# Patient Record
Sex: FEMALE | Race: WHITE | NOT HISPANIC OR LATINO | Employment: UNEMPLOYED | URBAN - METROPOLITAN AREA
[De-identification: names, ages, dates, MRNs, and addresses within clinical notes are randomized per-mention and may not be internally consistent; named-entity substitution may affect disease eponyms.]

---

## 2020-08-08 ENCOUNTER — HOSPITAL ENCOUNTER (INPATIENT)
Age: 50
LOS: 1 days | Discharge: HOME | DRG: 661 | End: 2020-08-10
Attending: EMERGENCY MEDICINE | Admitting: HOSPITALIST

## 2020-08-08 DIAGNOSIS — N39.0 URINARY TRACT INFECTION WITHOUT HEMATURIA, SITE UNSPECIFIED: ICD-10-CM

## 2020-08-08 DIAGNOSIS — N20.1 URETERAL STONE: Primary | ICD-10-CM

## 2020-08-08 DIAGNOSIS — R07.9 CHEST PAIN, UNSPECIFIED TYPE: ICD-10-CM

## 2020-08-08 LAB
ANION GAP SERPL CALC-SCNC: 18 MMOL/L (ref 10–20)
BASOPHILS # BLD: 0.1 K/MCL (ref 0–0.3)
BASOPHILS NFR BLD: 1 %
BUN SERPL-MCNC: 14 MG/DL (ref 6–20)
BUN/CREAT SERPL: 13 (ref 7–25)
CALCIUM SERPL-MCNC: 9 MG/DL (ref 8.4–10.2)
CHLORIDE SERPL-SCNC: 107 MMOL/L (ref 98–107)
CO2 SERPL-SCNC: 21 MMOL/L (ref 21–32)
CREAT SERPL-MCNC: 1.05 MG/DL (ref 0.51–0.95)
DEPRECATED RDW RBC: 48.9 FL (ref 39–50)
EOSINOPHIL # BLD: 0.4 K/MCL (ref 0.1–0.5)
EOSINOPHIL NFR BLD: 6 %
ERYTHROCYTE [DISTWIDTH] IN BLOOD: 13.4 % (ref 11–15)
FASTING DURATION TIME PATIENT: ABNORMAL H
GFR SERPLBLD BASED ON 1.73 SQ M-ARVRAT: 62 ML/MIN/1.73M2
GLUCOSE SERPL-MCNC: 125 MG/DL (ref 65–99)
HCT VFR BLD CALC: 39.2 % (ref 36–46.5)
HGB BLD-MCNC: 13.6 G/DL (ref 12–15.5)
IMM GRANULOCYTES # BLD AUTO: 0 K/MCL (ref 0–0.2)
IMM GRANULOCYTES # BLD: 0 %
LYMPHOCYTES # BLD: 1.3 K/MCL (ref 1–4.8)
LYMPHOCYTES NFR BLD: 19 %
MCH RBC QN AUTO: 33.9 PG (ref 26–34)
MCHC RBC AUTO-ENTMCNC: 34.7 G/DL (ref 32–36.5)
MCV RBC AUTO: 97.8 FL (ref 78–100)
MONOCYTES # BLD: 0.8 K/MCL (ref 0.3–0.9)
MONOCYTES NFR BLD: 12 %
NEUTROPHILS # BLD: 4.3 K/MCL (ref 1.8–7.7)
NEUTROPHILS NFR BLD: 62 %
NRBC BLD MANUAL-RTO: 0 /100 WBC
PLATELET # BLD AUTO: 113 K/MCL (ref 140–450)
POTASSIUM SERPL-SCNC: 3.5 MMOL/L (ref 3.4–5.1)
RBC # BLD: 4.01 MIL/MCL (ref 4–5.2)
SODIUM SERPL-SCNC: 142 MMOL/L (ref 135–145)
WBC # BLD: 6.9 K/MCL (ref 4.2–11)

## 2020-08-08 PROCEDURE — 96374 THER/PROPH/DIAG INJ IV PUSH: CPT

## 2020-08-08 PROCEDURE — 87086 URINE CULTURE/COLONY COUNT: CPT | Performed by: EMERGENCY MEDICINE

## 2020-08-08 PROCEDURE — 85025 COMPLETE CBC W/AUTO DIFF WBC: CPT | Performed by: EMERGENCY MEDICINE

## 2020-08-08 PROCEDURE — 81001 URINALYSIS AUTO W/SCOPE: CPT | Performed by: EMERGENCY MEDICINE

## 2020-08-08 PROCEDURE — 80048 BASIC METABOLIC PNL TOTAL CA: CPT | Performed by: EMERGENCY MEDICINE

## 2020-08-08 PROCEDURE — 10002800 HB RX 250 W HCPCS: Performed by: EMERGENCY MEDICINE

## 2020-08-08 PROCEDURE — 96375 TX/PRO/DX INJ NEW DRUG ADDON: CPT

## 2020-08-08 PROCEDURE — 99285 EMERGENCY DEPT VISIT HI MDM: CPT

## 2020-08-08 RX ORDER — LEVOTHYROXINE SODIUM 0.05 MG/1
50 TABLET ORAL DAILY
COMMUNITY

## 2020-08-08 RX ORDER — ONDANSETRON 2 MG/ML
4 INJECTION INTRAMUSCULAR; INTRAVENOUS ONCE
Status: COMPLETED | OUTPATIENT
Start: 2020-08-08 | End: 2020-08-08

## 2020-08-08 RX ADMIN — ONDANSETRON 4 MG: 2 INJECTION INTRAMUSCULAR; INTRAVENOUS at 23:54

## 2020-08-08 RX ADMIN — KETOROLAC TROMETHAMINE 15 MG: 15 INJECTION, SOLUTION INTRAMUSCULAR; INTRAVENOUS at 23:56

## 2020-08-08 ASSESSMENT — PAIN DESCRIPTION - PAIN TYPE: TYPE: ACUTE PAIN

## 2020-08-08 ASSESSMENT — PAIN SCALES - GENERAL: PAINLEVEL_OUTOF10: 8

## 2020-08-09 ENCOUNTER — APPOINTMENT (OUTPATIENT)
Dept: CT IMAGING | Age: 50
DRG: 661 | End: 2020-08-09

## 2020-08-09 ENCOUNTER — ANESTHESIA (OUTPATIENT)
Dept: SURGERY | Age: 50
DRG: 661 | End: 2020-08-09

## 2020-08-09 ENCOUNTER — ANESTHESIA EVENT (OUTPATIENT)
Dept: SURGERY | Age: 50
DRG: 661 | End: 2020-08-09

## 2020-08-09 ENCOUNTER — APPOINTMENT (OUTPATIENT)
Dept: GENERAL RADIOLOGY | Age: 50
DRG: 661 | End: 2020-08-09
Attending: UROLOGY

## 2020-08-09 LAB
ANION GAP SERPL CALC-SCNC: 20 MMOL/L (ref 10–20)
APPEARANCE UR: ABNORMAL
ATRIAL RATE (BPM): 102
B-HCG UR QL: NEGATIVE
BACTERIA #/AREA URNS HPF: ABNORMAL /HPF
BASOPHILS # BLD: 0 K/MCL (ref 0–0.3)
BASOPHILS NFR BLD: 0 %
BILIRUB UR QL STRIP: NEGATIVE
BUN SERPL-MCNC: 15 MG/DL (ref 6–20)
BUN/CREAT SERPL: 12 (ref 7–25)
CALCIUM SERPL-MCNC: 8.2 MG/DL (ref 8.4–10.2)
CHLORIDE SERPL-SCNC: 109 MMOL/L (ref 98–107)
CO2 SERPL-SCNC: 17 MMOL/L (ref 21–32)
COLOR UR: YELLOW
CREAT SERPL-MCNC: 1.25 MG/DL (ref 0.51–0.95)
DEPRECATED RDW RBC: 50.4 FL (ref 39–50)
EOSINOPHIL # BLD: 0.1 K/MCL (ref 0.1–0.5)
EOSINOPHIL NFR BLD: 3 %
ERYTHROCYTE [DISTWIDTH] IN BLOOD: 13.7 % (ref 11–15)
FASTING DURATION TIME PATIENT: ABNORMAL H
GFR SERPLBLD BASED ON 1.73 SQ M-ARVRAT: 50 ML/MIN/1.73M2
GLUCOSE SERPL-MCNC: 104 MG/DL (ref 65–99)
GLUCOSE UR STRIP-MCNC: NEGATIVE MG/DL
HCT VFR BLD CALC: 38.3 % (ref 36–46.5)
HGB BLD-MCNC: 12.8 G/DL (ref 12–15.5)
HGB UR QL STRIP: ABNORMAL
HYALINE CASTS #/AREA URNS LPF: ABNORMAL /LPF
KETONES UR STRIP-MCNC: NEGATIVE MG/DL
LEUKOCYTE ESTERASE UR QL STRIP: ABNORMAL
LYMPHOCYTES # BLD: 0.2 K/MCL (ref 1–4.8)
LYMPHOCYTES NFR BLD: 8 %
MCH RBC QN AUTO: 33.8 PG (ref 26–34)
MCHC RBC AUTO-ENTMCNC: 33.4 G/DL (ref 32–36.5)
MCV RBC AUTO: 101.1 FL (ref 78–100)
MONOCYTES # BLD: 0 K/MCL (ref 0.3–0.9)
MONOCYTES NFR BLD: 0 %
MUCOUS THREADS URNS QL MICRO: PRESENT
NEUTROPHILS # BLD: 1.6 K/MCL (ref 1.8–7.7)
NEUTS BAND NFR BLD: 15 %
NEUTS SEG NFR BLD: 72 %
NITRITE UR QL STRIP: NEGATIVE
NRBC BLD MANUAL-RTO: 0 /100 WBC
P AXIS (DEGREES): 21
PH UR STRIP: 6 [PH] (ref 5–7)
PLAT MORPH BLD: NORMAL
PLATELET # BLD AUTO: 86 K/MCL (ref 140–450)
POTASSIUM SERPL-SCNC: 4.1 MMOL/L (ref 3.4–5.1)
PR-INTERVAL (MSEC): 126
PROT UR STRIP-MCNC: NEGATIVE MG/DL
QRS-INTERVAL (MSEC): 90
QT-INTERVAL (MSEC): 344
QTC: 448
R AXIS (DEGREES): 18
RAINBOW EXTRA TUBES HOLD SPECIMEN: NORMAL
RBC # BLD: 3.79 MIL/MCL (ref 4–5.2)
RBC #/AREA URNS HPF: ABNORMAL /HPF
RBC MORPH BLD: NORMAL
REPORT TEXT: NORMAL
SODIUM SERPL-SCNC: 142 MMOL/L (ref 135–145)
SP GR UR STRIP: 1.01 (ref 1–1.03)
SQUAMOUS #/AREA URNS HPF: ABNORMAL /HPF
T AXIS (DEGREES): 48
UROBILINOGEN UR STRIP-MCNC: 2 MG/DL
VARIANT LYMPHS NFR BLD: 2 %
VENTRICULAR RATE EKG/MIN (BPM): 102
WBC # BLD: 1.8 K/MCL (ref 4.2–11)
WBC #/AREA URNS HPF: >100 /HPF
WBC MORPH BLD: ABNORMAL
WBC TOXIC VACUOLES BLD QL SMEAR: PRESENT

## 2020-08-09 PROCEDURE — 74430 CONTRAST X-RAY BLADDER: CPT

## 2020-08-09 PROCEDURE — 10002807 HB RX 258: Performed by: HOSPITALIST

## 2020-08-09 PROCEDURE — 93005 ELECTROCARDIOGRAM TRACING: CPT | Performed by: UROLOGY

## 2020-08-09 PROCEDURE — G0378 HOSPITAL OBSERVATION PER HR: HCPCS

## 2020-08-09 PROCEDURE — 52332 CYSTOSCOPY AND TREATMENT: CPT | Performed by: UROLOGY

## 2020-08-09 PROCEDURE — 36415 COLL VENOUS BLD VENIPUNCTURE: CPT | Performed by: HOSPITALIST

## 2020-08-09 PROCEDURE — 0T778DZ DILATION OF LEFT URETER WITH INTRALUMINAL DEVICE, VIA NATURAL OR ARTIFICIAL OPENING ENDOSCOPIC: ICD-10-PCS | Performed by: UROLOGY

## 2020-08-09 PROCEDURE — 93010 ELECTROCARDIOGRAM REPORT: CPT | Performed by: INTERNAL MEDICINE

## 2020-08-09 PROCEDURE — 10002803 HB RX 637: Performed by: HOSPITALIST

## 2020-08-09 PROCEDURE — 10002800 HB RX 250 W HCPCS: Performed by: PHYSICIAN ASSISTANT

## 2020-08-09 PROCEDURE — 85027 COMPLETE CBC AUTOMATED: CPT | Performed by: HOSPITALIST

## 2020-08-09 PROCEDURE — 10002803 HB RX 637: Performed by: INTERNAL MEDICINE

## 2020-08-09 PROCEDURE — 10002807 HB RX 258: Performed by: INTERNAL MEDICINE

## 2020-08-09 PROCEDURE — 99285 EMERGENCY DEPT VISIT HI MDM: CPT | Performed by: EMERGENCY MEDICINE

## 2020-08-09 PROCEDURE — 80048 BASIC METABOLIC PNL TOTAL CA: CPT | Performed by: HOSPITALIST

## 2020-08-09 PROCEDURE — 10006391 HB COMPLEX PROCEDURE: Performed by: UROLOGY

## 2020-08-09 PROCEDURE — 99220 INITIAL OBSERVATION CARE,LEVL III: CPT | Performed by: UROLOGY

## 2020-08-09 PROCEDURE — 10002800 HB RX 250 W HCPCS: Performed by: EMERGENCY MEDICINE

## 2020-08-09 PROCEDURE — 10002801 HB RX 250 W/O HCPCS: Performed by: UROLOGY

## 2020-08-09 PROCEDURE — 74176 CT ABD & PELVIS W/O CONTRAST: CPT | Performed by: RADIOLOGY

## 2020-08-09 PROCEDURE — 10002807 HB RX 258: Performed by: PHYSICIAN ASSISTANT

## 2020-08-09 PROCEDURE — 52332 CYSTOSCOPY AND TREATMENT: CPT | Performed by: ANESTHESIOLOGY

## 2020-08-09 PROCEDURE — 10002800 HB RX 250 W HCPCS: Performed by: ANESTHESIOLOGY

## 2020-08-09 PROCEDURE — 10002362 HB ANESTH MAC IV 1ST 1/2 HR: Performed by: UROLOGY

## 2020-08-09 PROCEDURE — C1769 GUIDE WIRE: HCPCS | Performed by: UROLOGY

## 2020-08-09 PROCEDURE — 10002800 HB RX 250 W HCPCS: Performed by: HOSPITALIST

## 2020-08-09 PROCEDURE — 10002803 HB RX 637: Performed by: EMERGENCY MEDICINE

## 2020-08-09 PROCEDURE — 81025 URINE PREGNANCY TEST: CPT | Performed by: UROLOGY

## 2020-08-09 PROCEDURE — 10002807 HB RX 258: Performed by: ANESTHESIOLOGY

## 2020-08-09 PROCEDURE — 99220 INITIAL OBSERVATION CARE,LEVL III: CPT | Performed by: HOSPITALIST

## 2020-08-09 PROCEDURE — 10002807 HB RX 258: Performed by: EMERGENCY MEDICINE

## 2020-08-09 PROCEDURE — 74176 CT ABD & PELVIS W/O CONTRAST: CPT

## 2020-08-09 PROCEDURE — 10004651 HB RX, NO CHARGE ITEM: Performed by: HOSPITALIST

## 2020-08-09 RX ORDER — POTASSIUM CHLORIDE 20 MEQ/1
20 TABLET, EXTENDED RELEASE ORAL EVERY 4 HOURS PRN
Status: DISCONTINUED | OUTPATIENT
Start: 2020-08-09 | End: 2020-08-10 | Stop reason: HOSPADM

## 2020-08-09 RX ORDER — POTASSIUM CHLORIDE 14.9 MG/ML
20 INJECTION INTRAVENOUS EVERY 4 HOURS PRN
Status: DISCONTINUED | OUTPATIENT
Start: 2020-08-09 | End: 2020-08-10 | Stop reason: HOSPADM

## 2020-08-09 RX ORDER — CEFAZOLIN SODIUM/WATER 1 G/10 ML
1000 SYRINGE (ML) INTRAVENOUS DAILY
Status: DISCONTINUED | OUTPATIENT
Start: 2020-08-09 | End: 2020-08-10 | Stop reason: HOSPADM

## 2020-08-09 RX ORDER — LIDOCAINE HYDROCHLORIDE 20 MG/ML
JELLY TOPICAL PRN
Status: DISCONTINUED | OUTPATIENT
Start: 2020-08-09 | End: 2020-08-09 | Stop reason: HOSPADM

## 2020-08-09 RX ORDER — MAGNESIUM HYDROXIDE 1200 MG/15ML
LIQUID ORAL PRN
Status: DISCONTINUED | OUTPATIENT
Start: 2020-08-09 | End: 2020-08-09 | Stop reason: HOSPADM

## 2020-08-09 RX ORDER — TRAMADOL HYDROCHLORIDE 50 MG/1
50 TABLET ORAL EVERY 6 HOURS PRN
Status: DISCONTINUED | OUTPATIENT
Start: 2020-08-09 | End: 2020-08-10 | Stop reason: HOSPADM

## 2020-08-09 RX ORDER — MAGNESIUM SULFATE 1 G/100ML
1 INJECTION INTRAVENOUS DAILY PRN
Status: DISCONTINUED | OUTPATIENT
Start: 2020-08-09 | End: 2020-08-10 | Stop reason: HOSPADM

## 2020-08-09 RX ORDER — ONDANSETRON 2 MG/ML
4 INJECTION INTRAMUSCULAR; INTRAVENOUS EVERY 12 HOURS PRN
Status: DISCONTINUED | OUTPATIENT
Start: 2020-08-09 | End: 2020-08-10 | Stop reason: HOSPADM

## 2020-08-09 RX ORDER — 0.9 % SODIUM CHLORIDE 0.9 %
2 VIAL (ML) INJECTION EVERY 12 HOURS SCHEDULED
Status: DISCONTINUED | OUTPATIENT
Start: 2020-08-09 | End: 2020-08-10 | Stop reason: HOSPADM

## 2020-08-09 RX ORDER — ACETAMINOPHEN 325 MG/1
650 TABLET ORAL EVERY 4 HOURS PRN
Status: DISCONTINUED | OUTPATIENT
Start: 2020-08-09 | End: 2020-08-10 | Stop reason: HOSPADM

## 2020-08-09 RX ORDER — POTASSIUM CHLORIDE 1.5 G/1.58G
20 POWDER, FOR SOLUTION ORAL EVERY 4 HOURS PRN
Status: DISCONTINUED | OUTPATIENT
Start: 2020-08-09 | End: 2020-08-10 | Stop reason: HOSPADM

## 2020-08-09 RX ORDER — TAMSULOSIN HYDROCHLORIDE 0.4 MG/1
0.4 CAPSULE ORAL ONCE
Status: COMPLETED | OUTPATIENT
Start: 2020-08-09 | End: 2020-08-09

## 2020-08-09 RX ORDER — POTASSIUM CHLORIDE 14.9 G/1000ML
40 INJECTION, SOLUTION INTRAVENOUS EVERY 4 HOURS PRN
Status: DISCONTINUED | OUTPATIENT
Start: 2020-08-09 | End: 2020-08-10 | Stop reason: HOSPADM

## 2020-08-09 RX ORDER — CEFAZOLIN SODIUM/WATER 1 G/10 ML
1000 SYRINGE (ML) INTRAVENOUS ONCE
Status: COMPLETED | OUTPATIENT
Start: 2020-08-09 | End: 2020-08-09

## 2020-08-09 RX ORDER — LEVOTHYROXINE SODIUM 0.05 MG/1
50 TABLET ORAL DAILY
Status: DISCONTINUED | OUTPATIENT
Start: 2020-08-09 | End: 2020-08-10 | Stop reason: HOSPADM

## 2020-08-09 RX ORDER — SODIUM CHLORIDE 9 MG/ML
INJECTION, SOLUTION INTRAVENOUS CONTINUOUS
Status: DISCONTINUED | OUTPATIENT
Start: 2020-08-09 | End: 2020-08-10 | Stop reason: HOSPADM

## 2020-08-09 RX ORDER — POTASSIUM CHLORIDE 1.5 G/1.58G
40 POWDER, FOR SOLUTION ORAL EVERY 4 HOURS PRN
Status: DISCONTINUED | OUTPATIENT
Start: 2020-08-09 | End: 2020-08-10 | Stop reason: HOSPADM

## 2020-08-09 RX ORDER — PROPOFOL 10 MG/ML
INJECTION, EMULSION INTRAVENOUS PRN
Status: DISCONTINUED | OUTPATIENT
Start: 2020-08-09 | End: 2020-08-09

## 2020-08-09 RX ORDER — SODIUM CHLORIDE, SODIUM LACTATE, POTASSIUM CHLORIDE, CALCIUM CHLORIDE 600; 310; 30; 20 MG/100ML; MG/100ML; MG/100ML; MG/100ML
INJECTION, SOLUTION INTRAVENOUS CONTINUOUS PRN
Status: DISCONTINUED | OUTPATIENT
Start: 2020-08-09 | End: 2020-08-09

## 2020-08-09 RX ORDER — POTASSIUM CHLORIDE 20 MEQ/1
40 TABLET, EXTENDED RELEASE ORAL EVERY 4 HOURS PRN
Status: DISCONTINUED | OUTPATIENT
Start: 2020-08-09 | End: 2020-08-10 | Stop reason: HOSPADM

## 2020-08-09 RX ADMIN — SODIUM CHLORIDE: 9 INJECTION, SOLUTION INTRAVENOUS at 03:04

## 2020-08-09 RX ADMIN — HYDROMORPHONE HYDROCHLORIDE 1 MG: 1 INJECTION, SOLUTION INTRAMUSCULAR; INTRAVENOUS; SUBCUTANEOUS at 03:01

## 2020-08-09 RX ADMIN — SODIUM CHLORIDE: 9 INJECTION, SOLUTION INTRAVENOUS at 22:41

## 2020-08-09 RX ADMIN — TAMSULOSIN HYDROCHLORIDE 0.4 MG: 0.4 CAPSULE ORAL at 01:33

## 2020-08-09 RX ADMIN — LEVOTHYROXINE SODIUM 50 MCG: 50 TABLET ORAL at 15:51

## 2020-08-09 RX ADMIN — PROPOFOL 50 MG: 10 INJECTION, EMULSION INTRAVENOUS at 08:56

## 2020-08-09 RX ADMIN — ONDANSETRON 4 MG: 2 INJECTION INTRAMUSCULAR; INTRAVENOUS at 04:55

## 2020-08-09 RX ADMIN — POTASSIUM CHLORIDE 20 MEQ: 1500 TABLET, EXTENDED RELEASE ORAL at 03:01

## 2020-08-09 RX ADMIN — TRAMADOL HYDROCHLORIDE 50 MG: 50 TABLET, FILM COATED ORAL at 16:46

## 2020-08-09 RX ADMIN — SODIUM CHLORIDE 1000 ML: 9 INJECTION, SOLUTION INTRAVENOUS at 01:19

## 2020-08-09 RX ADMIN — PROPOFOL 125 MCG/KG/MIN: 10 INJECTION, EMULSION INTRAVENOUS at 08:56

## 2020-08-09 RX ADMIN — SODIUM CHLORIDE 500 ML: 9 INJECTION, SOLUTION INTRAVENOUS at 11:55

## 2020-08-09 RX ADMIN — CEFTRIAXONE SODIUM 1000 MG: 100 INJECTION, POWDER, FOR SOLUTION INTRAVENOUS at 01:31

## 2020-08-09 RX ADMIN — HYDROMORPHONE HYDROCHLORIDE 1 MG: 1 INJECTION, SOLUTION INTRAMUSCULAR; INTRAVENOUS; SUBCUTANEOUS at 06:33

## 2020-08-09 RX ADMIN — TRAMADOL HYDROCHLORIDE 50 MG: 50 TABLET, FILM COATED ORAL at 10:33

## 2020-08-09 RX ADMIN — MORPHINE SULFATE 4 MG: 4 INJECTION INTRAVENOUS at 01:23

## 2020-08-09 RX ADMIN — TRAMADOL HYDROCHLORIDE 50 MG: 50 TABLET, FILM COATED ORAL at 22:41

## 2020-08-09 RX ADMIN — ACETAMINOPHEN 650 MG: 325 TABLET ORAL at 22:41

## 2020-08-09 RX ADMIN — SODIUM CHLORIDE, POTASSIUM CHLORIDE, SODIUM LACTATE AND CALCIUM CHLORIDE: 600; 310; 30; 20 INJECTION, SOLUTION INTRAVENOUS at 08:53

## 2020-08-09 RX ADMIN — CEFTRIAXONE SODIUM 1000 MG: 100 INJECTION, POWDER, FOR SOLUTION INTRAVENOUS at 13:10

## 2020-08-09 ASSESSMENT — ACTIVITIES OF DAILY LIVING (ADL)
ADL_SCORE: 12
ADL_BEFORE_ADMISSION: INDEPENDENT
RECENT_DECLINE_ADL: NO
CHRONIC_PAIN_PRESENT: NO
ADL_SHORT_OF_BREATH: NO

## 2020-08-09 ASSESSMENT — PATIENT HEALTH QUESTIONNAIRE - PHQ9
CLINICAL INTERPRETATION OF PHQ9 SCORE: NO FURTHER SCREENING NEEDED
SUM OF ALL RESPONSES TO PHQ9 QUESTIONS 1 AND 2: 0
SUM OF ALL RESPONSES TO PHQ9 QUESTIONS 1 AND 2: 0
2. FEELING DOWN, DEPRESSED OR HOPELESS: NOT AT ALL
IS PATIENT ABLE TO COMPLETE PHQ2 OR PHQ9: YES
CLINICAL INTERPRETATION OF PHQ2 SCORE: NO FURTHER SCREENING NEEDED
1. LITTLE INTEREST OR PLEASURE IN DOING THINGS: NOT AT ALL

## 2020-08-09 ASSESSMENT — PAIN SCALES - GENERAL
PAINLEVEL_OUTOF10: 4
PAINLEVEL_OUTOF10: 5
PAINLEVEL_OUTOF10: 4
PAINLEVEL_OUTOF10: 6
PAINLEVEL_OUTOF10: 5
PAINLEVEL_OUTOF10: 4
PAINLEVEL_OUTOF10: 6
PAINLEVEL_OUTOF10: 3
PAINLEVEL_OUTOF10: 8
PAINLEVEL_OUTOF10: 5
PAINLEVEL_OUTOF10: 4
PAINLEVEL_OUTOF10: 8
PAINLEVEL_OUTOF10: 3
PAINLEVEL_OUTOF10: 4
PAINLEVEL_OUTOF10: 8

## 2020-08-09 ASSESSMENT — COLUMBIA-SUICIDE SEVERITY RATING SCALE - C-SSRS
6. HAVE YOU EVER DONE ANYTHING, STARTED TO DO ANYTHING, OR PREPARED TO DO ANYTHING TO END YOUR LIFE?: NO
IS THE PATIENT ABLE TO COMPLETE C-SSRS: YES
2. HAVE YOU ACTUALLY HAD ANY THOUGHTS OF KILLING YOURSELF?: NO
1. WITHIN THE PAST MONTH, HAVE YOU WISHED YOU WERE DEAD OR WISHED YOU COULD GO TO SLEEP AND NOT WAKE UP?: NO

## 2020-08-09 ASSESSMENT — COGNITIVE AND FUNCTIONAL STATUS - GENERAL
ARE YOU BLIND OR DO YOU HAVE SERIOUS DIFFICULTY SEEING, EVEN WHEN WEARING GLASSES: NO
ARE YOU DEAF OR DO YOU HAVE SERIOUS DIFFICULTY  HEARING: NO
BECAUSE OF A PHYSICAL, MENTAL, OR EMOTIONAL CONDITION, DO YOU HAVE DIFFICULTY DOING ERRANDS ALONE: NO
DO YOU HAVE SERIOUS DIFFICULTY WALKING OR CLIMBING STAIRS: NO
DO YOU HAVE DIFFICULTY DRESSING OR BATHING: NO
BECAUSE OF A PHYSICAL, MENTAL, OR EMOTIONAL CONDITION, DO YOU HAVE SERIOUS DIFFICULTY CONCENTRATING, REMEMBERING OR MAKING DECISIONS: NO

## 2020-08-09 ASSESSMENT — LIFESTYLE VARIABLES
HOW OFTEN DO YOU HAVE A DRINK CONTAINING ALCOHOL: 2 TO 4 TIMES PER MONTH
AUDIT-C TOTAL SCORE: 2
ALCOHOL_USE_STATUS: NO OR LOW RISK WITH VALIDATED TOOL
HOW MANY STANDARD DRINKS CONTAINING ALCOHOL DO YOU HAVE ON A TYPICAL DAY: 0,1 OR 2
HOW OFTEN DO YOU HAVE 6 OR MORE DRINKS ON ONE OCCASION: NEVER

## 2020-08-09 ASSESSMENT — PAIN DESCRIPTION - PAIN TYPE
TYPE: ACUTE PAIN
TYPE: ACUTE PAIN

## 2020-08-10 VITALS
OXYGEN SATURATION: 97 % | HEART RATE: 65 BPM | HEIGHT: 70 IN | DIASTOLIC BLOOD PRESSURE: 62 MMHG | TEMPERATURE: 97.7 F | BODY MASS INDEX: 28.18 KG/M2 | WEIGHT: 196.87 LBS | SYSTOLIC BLOOD PRESSURE: 101 MMHG | RESPIRATION RATE: 16 BRPM

## 2020-08-10 LAB
ANION GAP SERPL CALC-SCNC: 14 MMOL/L (ref 10–20)
BACTERIA UR CULT: ABNORMAL
BASOPHILS # BLD: 0.1 K/MCL (ref 0–0.3)
BASOPHILS NFR BLD: 1 %
BUN SERPL-MCNC: 14 MG/DL (ref 6–20)
BUN/CREAT SERPL: 11 (ref 7–25)
CALCIUM SERPL-MCNC: 7.8 MG/DL (ref 8.4–10.2)
CHLORIDE SERPL-SCNC: 112 MMOL/L (ref 98–107)
CO2 SERPL-SCNC: 20 MMOL/L (ref 21–32)
CREAT SERPL-MCNC: 1.24 MG/DL (ref 0.51–0.95)
DEPRECATED RDW RBC: 53 FL (ref 39–50)
EOSINOPHIL # BLD: 0.4 K/MCL (ref 0.1–0.5)
EOSINOPHIL NFR BLD: 4 %
ERYTHROCYTE [DISTWIDTH] IN BLOOD: 14 % (ref 11–15)
FASTING DURATION TIME PATIENT: ABNORMAL H
GFR SERPLBLD BASED ON 1.73 SQ M-ARVRAT: 51 ML/MIN/1.73M2
GLUCOSE SERPL-MCNC: 98 MG/DL (ref 65–99)
HCT VFR BLD CALC: 33.1 % (ref 36–46.5)
HGB BLD-MCNC: 10.9 G/DL (ref 12–15.5)
IMM GRANULOCYTES # BLD AUTO: 0.1 K/MCL (ref 0–0.2)
IMM GRANULOCYTES # BLD: 1 %
LYMPHOCYTES # BLD: 1 K/MCL (ref 1–4.8)
LYMPHOCYTES NFR BLD: 11 %
MCH RBC QN AUTO: 33.5 PG (ref 26–34)
MCHC RBC AUTO-ENTMCNC: 32.9 G/DL (ref 32–36.5)
MCV RBC AUTO: 101.8 FL (ref 78–100)
MONOCYTES # BLD: 1.2 K/MCL (ref 0.3–0.9)
MONOCYTES NFR BLD: 13 %
NEUTROPHILS # BLD: 6.4 K/MCL (ref 1.8–7.7)
NEUTROPHILS NFR BLD: 70 %
NRBC BLD MANUAL-RTO: 0 /100 WBC
PLATELET # BLD AUTO: 67 K/MCL (ref 140–450)
POTASSIUM SERPL-SCNC: 3.8 MMOL/L (ref 3.4–5.1)
RBC # BLD: 3.25 MIL/MCL (ref 4–5.2)
SODIUM SERPL-SCNC: 142 MMOL/L (ref 135–145)
WBC # BLD: 9.1 K/MCL (ref 4.2–11)

## 2020-08-10 PROCEDURE — 85025 COMPLETE CBC W/AUTO DIFF WBC: CPT | Performed by: INTERNAL MEDICINE

## 2020-08-10 PROCEDURE — 99239 HOSP IP/OBS DSCHRG MGMT >30: CPT | Performed by: INTERNAL MEDICINE

## 2020-08-10 PROCEDURE — 10000002 HB ROOM CHARGE MED SURG

## 2020-08-10 PROCEDURE — 80048 BASIC METABOLIC PNL TOTAL CA: CPT | Performed by: INTERNAL MEDICINE

## 2020-08-10 PROCEDURE — 10002803 HB RX 637: Performed by: INTERNAL MEDICINE

## 2020-08-10 PROCEDURE — G0378 HOSPITAL OBSERVATION PER HR: HCPCS

## 2020-08-10 PROCEDURE — 36415 COLL VENOUS BLD VENIPUNCTURE: CPT | Performed by: INTERNAL MEDICINE

## 2020-08-10 RX ORDER — CIPROFLOXACIN 500 MG/1
500 TABLET, FILM COATED ORAL EVERY 12 HOURS
Qty: 14 TABLET | Refills: 0 | Status: SHIPPED | OUTPATIENT
Start: 2020-08-10 | End: 2020-08-17

## 2020-08-10 RX ORDER — TRAMADOL HYDROCHLORIDE 50 MG/1
50 TABLET ORAL EVERY 6 HOURS PRN
Qty: 20 TABLET | Refills: 0 | Status: SHIPPED | OUTPATIENT
Start: 2020-08-10

## 2020-08-10 RX ADMIN — LEVOTHYROXINE SODIUM 50 MCG: 50 TABLET ORAL at 08:26

## 2020-08-10 ASSESSMENT — PAIN SCALES - GENERAL: PAINLEVEL_OUTOF10: 0

## 2023-02-28 RX ORDER — LEVOTHYROXINE SODIUM 0.05 MG/1
50 TABLET ORAL DAILY
Qty: 30 TABLET | Refills: 0 | Status: SHIPPED | OUTPATIENT
Start: 2023-02-28 | End: 2023-03-24

## 2023-02-28 NOTE — TELEPHONE ENCOUNTER
Caller: Oliver, Antonieta ASHLY    Relationship: Self    Best call back number: [phone]    Requested Prescriptions:   Requested Prescriptions     Pending Prescriptions Disp Refills   • levothyroxine (SYNTHROID, LEVOTHROID) 50 MCG tablet       Sig: Take 1 tablet by mouth.      Pharmacy where request should be sent: [id number] - [place], [place] - [address] [phone] [facility] [phone]      Additional details provided by patient:     PATIENT ADVISED SHE WILL RUN OUT OF THE MEDICATION WITHIN 3 DAYS    Does the patient have less than a 3 day supply:  [x] Yes  [] No    Would you like a call back once the refill request has been completed: [x] Yes [] No    If the office needs to give you a call back, can they leave a voicemail: [x] Yes [] No    Cyn Rodriguez Rep   02/28/23 13:25 EST

## 2023-03-24 RX ORDER — LEVOTHYROXINE SODIUM 0.05 MG/1
TABLET ORAL
Qty: 30 TABLET | Refills: 0 | Status: SHIPPED | OUTPATIENT
Start: 2023-03-24

## 2023-04-18 ENCOUNTER — OFFICE VISIT (OUTPATIENT)
Dept: FAMILY MEDICINE CLINIC | Facility: CLINIC | Age: 53
End: 2023-04-18
Payer: COMMERCIAL

## 2023-04-18 VITALS
BODY MASS INDEX: 27.06 KG/M2 | SYSTOLIC BLOOD PRESSURE: 120 MMHG | WEIGHT: 189 LBS | HEART RATE: 81 BPM | DIASTOLIC BLOOD PRESSURE: 82 MMHG | OXYGEN SATURATION: 96 % | HEIGHT: 70 IN

## 2023-04-18 DIAGNOSIS — I81 PORTAL VEIN THROMBOSIS: ICD-10-CM

## 2023-04-18 DIAGNOSIS — D69.6 THROMBOCYTOPENIA: ICD-10-CM

## 2023-04-18 DIAGNOSIS — K70.30 ALCOHOLIC CIRRHOSIS OF LIVER WITHOUT ASCITES: ICD-10-CM

## 2023-04-18 DIAGNOSIS — E03.9 HYPOTHYROIDISM (ACQUIRED): ICD-10-CM

## 2023-04-18 DIAGNOSIS — Z00.00 GENERAL MEDICAL EXAM: Primary | ICD-10-CM

## 2023-04-18 DIAGNOSIS — E53.8 VITAMIN B12 DEFICIENCY: ICD-10-CM

## 2023-04-18 PROBLEM — I85.10 SECONDARY ESOPHAGEAL VARICES WITHOUT BLEEDING: Status: RESOLVED | Noted: 2023-04-05 | Resolved: 2023-04-18

## 2023-04-18 PROBLEM — D50.9 IRON (FE) DEFICIENCY ANEMIA: Status: RESOLVED | Noted: 2022-04-28 | Resolved: 2023-04-18

## 2023-04-18 PROBLEM — I85.10 SECONDARY ESOPHAGEAL VARICES WITHOUT BLEEDING: Status: ACTIVE | Noted: 2023-04-05

## 2023-04-18 PROBLEM — D50.9 IRON (FE) DEFICIENCY ANEMIA: Status: ACTIVE | Noted: 2022-04-28

## 2023-04-18 RX ORDER — SPIRONOLACTONE 50 MG/1
1 TABLET, FILM COATED ORAL DAILY
COMMUNITY
Start: 2023-03-13

## 2023-04-18 RX ORDER — FUROSEMIDE 20 MG/1
1 TABLET ORAL DAILY
COMMUNITY
Start: 2023-02-22

## 2023-04-18 RX ORDER — LEVOTHYROXINE SODIUM 0.05 MG/1
50 TABLET ORAL DAILY
Qty: 90 TABLET | Refills: 3 | Status: SHIPPED | OUTPATIENT
Start: 2023-04-18

## 2023-04-18 RX ORDER — LANOLIN ALCOHOL/MO/W.PET/CERES
1000 CREAM (GRAM) TOPICAL DAILY
Qty: 90 TABLET | Refills: 3 | Status: SHIPPED | OUTPATIENT
Start: 2023-04-18

## 2023-04-18 NOTE — ASSESSMENT & PLAN NOTE
Discussed together health maintenance and screening along with vaccination options and healthy diet and exercise habits as part of the preventative counseling at their physical exam today.    Updated vaccinations together today.    Encourage COVID booster.    Up-to-date with GYN checkup with breast exam, mammogram, pelvic exam, Pap smear.  Continues on progesterone per GYN for menopausal symptoms.    Ongoing follow-up with hematology given new diagnosis of cirrhosis with ascites.  Continues with Lasix and spironolactone.    Continue Synthroid for hypothyroidism and vitamin B12 supplementation given history of B12 deficiency.    Lab work up-to-date with hepatology and the transplant team.  We will check B12 and thyroid studies today.    Recheck in 1 year or sooner if problems arise

## 2023-04-18 NOTE — ASSESSMENT & PLAN NOTE
Chronic.  Hematology did not recommend that she stay patient given new discovery of liver disease.

## 2023-04-18 NOTE — PROGRESS NOTES
"Chief Complaint  Annual Exam (Patient is fasting )    Subjective    History of Present Illness:  Antonieta Oliver is a 52 y.o. female who presents today for physical exam and discuss new health concerns that have happened over the past year.    She had an episode with distended abdomen with work-up that indicated cirrhosis.  They did do lab work-up with no clear etiology and felt it might be related to moderate to heavy alcohol use in her past.  She was not a daily drinker.  She has abstained from alcohol since the diagnosis of presumed alcoholic cirrhosis and her MELD score has improved.    Ongoing follow-up with  hepatology and she does have an appointment to be considered for the transplant team.    She does continue on progesterone from gynecology given problems with menopausal symptoms and insomnia.  Pap up-to-date this year.  Mammogram up-to-date October 2022 without abnormality.    Colonoscopy up-to-date August 2022 with polyps.  They recommended she repeat in 3 years (August 2025).    She is getting hep B vaccination through the transplant center and is due for her second hep B later this year.  She plans to get that through the transplant team.    Continues on combination of furosemide and spironolactone to help with mild ascites.    Continues on Synthroid for hypothyroidism and vitamin B12 given history of vitamin B12 deficiency.    Follow-up ongoing with hematology for chronic thrombocytopenia now felt possibly related to her cirrhosis.    Objective   Vital Signs:   /82 (BP Location: Left arm, Patient Position: Sitting, Cuff Size: Adult)   Pulse 81   Ht 177.8 cm (70\")   Wt 85.7 kg (189 lb)   SpO2 96%   BMI 27.12 kg/m²     Review of Systems   Constitutional: Negative for appetite change, chills and fever.   HENT: Negative for hearing loss.    Eyes: Negative for blurred vision.   Respiratory: Negative for chest tightness.    Cardiovascular: Negative for chest pain.   Gastrointestinal: Negative " for abdominal pain.   Musculoskeletal: Negative for gait problem.   Skin: Negative for rash.   Psychiatric/Behavioral: Negative for depressed mood.       Past History:  Medical History: has a past medical history of Disease of thyroid gland.   Surgical History: has a past surgical history that includes Foot surgery (Bilateral); Colectomy partial / total; and Cholecystectomy.   Family History: family history is not on file. She was adopted.   Social History: reports that she has never smoked. She has never used smokeless tobacco. She reports current alcohol use. She reports that she does not use drugs.      Current Outpatient Medications:   •  furosemide (LASIX) 20 MG tablet, Take 1 tablet by mouth Daily., Disp: , Rfl:   •  levothyroxine (SYNTHROID, LEVOTHROID) 50 MCG tablet, Take 1 tablet by mouth Daily., Disp: 90 tablet, Rfl: 3  •  Progesterone (PROMETRIUM) 100 MG capsule, Take 1 capsule by mouth every night at bedtime., Disp: , Rfl:   •  spironolactone (ALDACTONE) 50 MG tablet, Take 1 tablet by mouth Daily., Disp: , Rfl:   •  vitamin B-12 (CYANOCOBALAMIN) 1000 MCG tablet, Take 1 tablet by mouth Daily., Disp: 90 tablet, Rfl: 3    Allergies: Patient has no known allergies.    Physical Exam  Constitutional:       Appearance: Normal appearance.   HENT:      Head: Normocephalic.      Right Ear: External ear normal.      Left Ear: External ear normal.      Nose: Nose normal.   Eyes:      Pupils: Pupils are equal, round, and reactive to light.   Cardiovascular:      Rate and Rhythm: Normal rate and regular rhythm.      Heart sounds: Normal heart sounds.   Pulmonary:      Effort: Pulmonary effort is normal.      Breath sounds: Normal breath sounds.   Abdominal:      General: Abdomen is flat. There is no distension.   Musculoskeletal:         General: Normal range of motion.      Cervical back: Normal range of motion.      Right lower leg: No edema.      Left lower leg: No edema.   Skin:     General: Skin is warm and dry.    Neurological:      General: No focal deficit present.      Mental Status: She is alert.   Psychiatric:         Mood and Affect: Mood normal.         Behavior: Behavior normal.         Thought Content: Thought content normal.          Result Review                   Assessment and Plan  Diagnoses and all orders for this visit:    1. General medical exam (Primary)  Assessment & Plan:  Discussed together health maintenance and screening along with vaccination options and healthy diet and exercise habits as part of the preventative counseling at their physical exam today.    Updated vaccinations together today.    Encourage COVID booster.    Up-to-date with GYN checkup with breast exam, mammogram, pelvic exam, Pap smear.  Continues on progesterone per GYN for menopausal symptoms.    Ongoing follow-up with hematology given new diagnosis of cirrhosis with ascites.  Continues with Lasix and spironolactone.    Continue Synthroid for hypothyroidism and vitamin B12 supplementation given history of B12 deficiency.    Lab work up-to-date with hepatology and the transplant team.  We will check B12 and thyroid studies today.    Recheck in 1 year or sooner if problems arise      2. Alcoholic cirrhosis of liver without ascites  Assessment & Plan:  See above.  Continues to abstain from alcohol      3. Portal vein thrombosis  Assessment & Plan:  Chronic.  Hematology did not recommend that she stay patient given new discovery of liver disease.      4. Thrombocytopenia  Assessment & Plan:  No bleeding problems.  They now suspect this may be related to her chronic liver disease and cirrhosis.  Hematology and hepatology following      5. Vitamin B12 deficiency  -     Vitamin B12; Future  -     vitamin B-12 (CYANOCOBALAMIN) 1000 MCG tablet; Take 1 tablet by mouth Daily.  Dispense: 90 tablet; Refill: 3  -     Vitamin B12    6. Hypothyroidism (acquired)  Assessment & Plan:  Refilled Synthroid.    Orders:  -     TSH; Future  -     T4,  Free; Future  -     levothyroxine (SYNTHROID, LEVOTHROID) 50 MCG tablet; Take 1 tablet by mouth Daily.  Dispense: 90 tablet; Refill: 3  -     TSH  -     T4, Free      BMI is >= 25 and <30. (Overweight) The following options were offered after discussion;: exercise counseling/recommendations and nutrition counseling/recommendations          Follow Up  Return in about 1 year (around 4/18/2024) for Annual physical, Fasting for labs at appointment (but drink water!).    Man Messina MD

## 2023-04-18 NOTE — ASSESSMENT & PLAN NOTE
No bleeding problems.  They now suspect this may be related to her chronic liver disease and cirrhosis.  Hematology and hepatology following

## 2023-04-19 LAB
T4 FREE SERPL-MCNC: 1.47 NG/DL (ref 0.82–1.77)
TSH SERPL DL<=0.005 MIU/L-ACNC: 1.89 UIU/ML (ref 0.45–4.5)
VIT B12 SERPL-MCNC: 1906 PG/ML (ref 232–1245)

## 2023-10-06 DIAGNOSIS — E03.9 HYPOTHYROIDISM (ACQUIRED): ICD-10-CM

## 2023-10-06 DIAGNOSIS — E53.8 VITAMIN B12 DEFICIENCY: ICD-10-CM

## 2023-10-06 NOTE — TELEPHONE ENCOUNTER
Caller: Oliver, Antonieta ASHLY    Relationship: Self    Best call back number: [phone]    Requested Prescriptions:   Requested Prescriptions     Pending Prescriptions Disp Refills    vitamin B-12 (CYANOCOBALAMIN) 1000 MCG tablet 90 tablet 3     Sig: Take 1 tablet by mouth Daily.    levothyroxine (SYNTHROID, LEVOTHROID) 50 MCG tablet 90 tablet 3     Sig: Take 1 tablet by mouth Daily.        Pharmacy where request should be sent: [id number] [address] [facility] [place] [address] - [phone]  - [phone]     Last office visit with prescribing clinician: 4/18/2023   Last telemedicine visit with prescribing clinician: Visit date not found   Next office visit with prescribing clinician: 4/19/2024     Additional details provided by patient:     Does the patient have less than a 3 day supply:  [x] Yes  [] No    Cyn Toth Rep   10/06/23 15:31 EDT

## 2023-10-09 RX ORDER — LANOLIN ALCOHOL/MO/W.PET/CERES
1000 CREAM (GRAM) TOPICAL DAILY
Qty: 90 TABLET | Refills: 3 | Status: SHIPPED | OUTPATIENT
Start: 2023-10-09

## 2023-10-09 RX ORDER — LEVOTHYROXINE SODIUM 0.05 MG/1
50 TABLET ORAL DAILY
Qty: 90 TABLET | Refills: 3 | Status: SHIPPED | OUTPATIENT
Start: 2023-10-09

## 2024-04-19 ENCOUNTER — OFFICE VISIT (OUTPATIENT)
Dept: FAMILY MEDICINE CLINIC | Facility: CLINIC | Age: 54
End: 2024-04-19
Payer: COMMERCIAL

## 2024-04-19 VITALS
SYSTOLIC BLOOD PRESSURE: 90 MMHG | DIASTOLIC BLOOD PRESSURE: 68 MMHG | HEART RATE: 106 BPM | BODY MASS INDEX: 25.24 KG/M2 | WEIGHT: 175.9 LBS | OXYGEN SATURATION: 98 %

## 2024-04-19 DIAGNOSIS — R10.9 FLANK PAIN: ICD-10-CM

## 2024-04-19 DIAGNOSIS — N12 PYELONEPHRITIS: Primary | ICD-10-CM

## 2024-04-19 RX ORDER — CEPHALEXIN 500 MG/1
500 CAPSULE ORAL 3 TIMES DAILY
COMMUNITY
Start: 2024-04-18 | End: 2024-04-19

## 2024-04-19 RX ORDER — ONDANSETRON 4 MG/1
4 TABLET, ORALLY DISINTEGRATING ORAL EVERY 8 HOURS PRN
COMMUNITY
Start: 2023-12-28 | End: 2024-04-19 | Stop reason: SDUPTHER

## 2024-04-19 RX ORDER — KETOROLAC TROMETHAMINE 30 MG/ML
60 INJECTION, SOLUTION INTRAMUSCULAR; INTRAVENOUS ONCE
Status: COMPLETED | OUTPATIENT
Start: 2024-04-19 | End: 2024-04-19

## 2024-04-19 RX ORDER — ONDANSETRON 4 MG/1
4 TABLET, ORALLY DISINTEGRATING ORAL EVERY 8 HOURS PRN
Qty: 20 TABLET | Refills: 5 | Status: SHIPPED | OUTPATIENT
Start: 2024-04-19

## 2024-04-19 RX ORDER — LEVOFLOXACIN 500 MG/1
500 TABLET, FILM COATED ORAL DAILY
Qty: 10 TABLET | Refills: 0 | Status: SHIPPED | OUTPATIENT
Start: 2024-04-19 | End: 2024-04-29

## 2024-04-19 RX ORDER — NAPROXEN 500 MG/1
500 TABLET ORAL 2 TIMES DAILY
COMMUNITY
Start: 2024-04-18

## 2024-04-19 RX ORDER — CEFTRIAXONE 1 G/1
1 INJECTION, POWDER, FOR SOLUTION INTRAMUSCULAR; INTRAVENOUS EVERY 24 HOURS
Status: COMPLETED | OUTPATIENT
Start: 2024-04-19 | End: 2024-04-19

## 2024-04-19 RX ADMIN — KETOROLAC TROMETHAMINE 60 MG: 30 INJECTION, SOLUTION INTRAMUSCULAR; INTRAVENOUS at 14:15

## 2024-04-19 RX ADMIN — CEFTRIAXONE 1 G: 1 INJECTION, POWDER, FOR SOLUTION INTRAMUSCULAR; INTRAVENOUS at 14:14

## 2024-04-19 NOTE — PROGRESS NOTES
Chief Complaint  Urinary Tract Infection    Subjective    History of Present Illness:  Antonieta Oliver is a 53 y.o. female who presents today for problems with worsening urinary infection symptoms.  She was recently on a trip and started getting typical UTI symptoms but tried to hold off on evaluation until she returned home.  She was seen at [facility] with discovery of UTI with CBC without leukocytosis and CT imaging was done given her flank pain with evidence for bilateral kidney stones but no ureteral stones.    She has had chills with fever and flank pain concerning for progression to pyelonephritis.    She has been taking Keflex and naproxen without improvement in symptoms.    No known drug allergies and is willing to get both Rocephin and Toradol today given her worsening UTI/pyelonephritis symptoms and flank pain.    We did discuss if worsening symptoms she would need repeat evaluation in the emergency room and possible admission for IV antibiotics.    Her blood pressure does tend to run low but it is running lower today and may be related to her infection.      Objective   Vital Signs:   BP 90/68 (BP Location: Right arm, Patient Position: Sitting, Cuff Size: Large Adult)   Pulse 106   Wt 79.8 kg (175 lb 14.4 oz)   SpO2 98%   BMI 25.24 kg/m²     Review of Systems   Constitutional:  Positive for chills and fatigue. Negative for appetite change and fever.   HENT:  Negative for hearing loss.    Eyes:  Negative for blurred vision.   Respiratory:  Negative for chest tightness.    Cardiovascular:  Negative for chest pain.   Gastrointestinal:  Negative for abdominal pain.   Musculoskeletal:  Negative for gait problem.   Skin:  Negative for rash.   Psychiatric/Behavioral:  Negative for depressed mood.        Past History:  Medical History: has a past medical history of Disease of thyroid gland, Hypothyroidism, Kidney stone (07/2017), and Liver disease (04/2022).   Surgical History: has a past surgical  history that includes Foot surgery (Bilateral); Colectomy partial / total; Cholecystectomy; Colon surgery; and Colonoscopy (08/2022).   Family History: family history is not on file. She was adopted.   Social History: reports that she has never smoked. She has never used smokeless tobacco. She reports that she does not currently use alcohol. She reports that she does not use drugs.      Current Outpatient Medications:     furosemide (LASIX) 20 MG tablet, Take 1 tablet by mouth Daily., Disp: , Rfl:     levothyroxine (SYNTHROID, LEVOTHROID) 50 MCG tablet, Take 1 tablet by mouth Daily., Disp: 90 tablet, Rfl: 3    naproxen (NAPROSYN) 500 MG tablet, Take 1 tablet by mouth 2 (Two) Times a Day., Disp: , Rfl:     ondansetron ODT (ZOFRAN-ODT) 4 MG disintegrating tablet, Take 1 tablet by mouth Every 8 (Eight) Hours As Needed for Nausea or Vomiting., Disp: 20 tablet, Rfl: 5    Progesterone (PROMETRIUM) 100 MG capsule, Take 1 capsule by mouth every night at bedtime., Disp: , Rfl:     spironolactone (ALDACTONE) 50 MG tablet, Take 1 tablet by mouth Daily., Disp: , Rfl:     vitamin B-12 (CYANOCOBALAMIN) 1000 MCG tablet, Take 1 tablet by mouth Daily., Disp: 90 tablet, Rfl: 3    levoFLOXacin (Levaquin) 500 MG tablet, Take 1 tablet by mouth Daily for 10 days., Disp: 10 tablet, Rfl: 0  No current facility-administered medications for this visit.    Allergies: Patient has no known allergies.    Physical Exam  Constitutional:       Appearance: She is ill-appearing.   HENT:      Head: Normocephalic.      Right Ear: External ear normal.      Left Ear: External ear normal.      Nose: Nose normal.   Eyes:      Pupils: Pupils are equal, round, and reactive to light.   Cardiovascular:      Rate and Rhythm: Normal rate and regular rhythm.      Heart sounds: Normal heart sounds.   Pulmonary:      Effort: Pulmonary effort is normal.      Breath sounds: Normal breath sounds.   Abdominal:      General: There is no distension.      Palpations:  There is no mass.      Tenderness: There is abdominal tenderness. There is right CVA tenderness and left CVA tenderness.   Musculoskeletal:      Cervical back: Normal range of motion.   Skin:     General: Skin is warm and dry.   Neurological:      General: No focal deficit present.      Mental Status: She is oriented to person, place, and time.   Psychiatric:         Mood and Affect: Mood normal.         Behavior: Behavior normal.         Thought Content: Thought content normal.          Result Review                   Assessment and Plan  Diagnoses and all orders for this visit:    1. Pyelonephritis (Primary)  Assessment & Plan:  Discussed concerns regarding possible pyelonephritis given worsening symptoms despite treatment with Keflex.    We did review treatment options and she was given 1 g of Rocephin along with Toradol for her ongoing pain.  We did review her lab workup, urinalysis, and CT scan done recently at [facility].    Refilled Zofran to use for nausea and also started on Levaquin for concerns for pyelonephritis.    We did discuss together that if her symptoms worsen or fail to improve that she will need repeat ER evaluation for possible admission.    Patient and her  voiced understanding and are comfortable plan.  Plan to recheck in 1 to 2 weeks or sooner if problems arise    Orders:  -     cefTRIAXone (ROCEPHIN) injection 1 g  -     ondansetron ODT (ZOFRAN-ODT) 4 MG disintegrating tablet; Take 1 tablet by mouth Every 8 (Eight) Hours As Needed for Nausea or Vomiting.  Dispense: 20 tablet; Refill: 5  -     levoFLOXacin (Levaquin) 500 MG tablet; Take 1 tablet by mouth Daily for 10 days.  Dispense: 10 tablet; Refill: 0    2. Flank pain  -     ketorolac (TORADOL) injection 60 mg                  Follow Up  Return in about 2 weeks (around 5/3/2024) for Med recheck.    Man Messina MD

## 2024-04-19 NOTE — ASSESSMENT & PLAN NOTE
Discussed concerns regarding possible pyelonephritis given worsening symptoms despite treatment with Keflex.    We did review treatment options and she was given 1 g of Rocephin along with Toradol for her ongoing pain.  We did review her lab workup, urinalysis, and CT scan done recently at [facility].    Refilled Zofran to use for nausea and also started on Levaquin for concerns for pyelonephritis.    We did discuss together that if her symptoms worsen or fail to improve that she will need repeat ER evaluation for possible admission.    Patient and her  voiced understanding and are comfortable plan.  Plan to recheck in 1 to 2 weeks or sooner if problems arise

## 2024-04-22 ENCOUNTER — TELEPHONE (OUTPATIENT)
Dept: FAMILY MEDICINE CLINIC | Facility: CLINIC | Age: 54
End: 2024-04-22
Payer: COMMERCIAL

## 2024-04-22 NOTE — TELEPHONE ENCOUNTER
Patient was seen on 04/19.Patient  had called on 04/20 concerned about side effects of medication prescribed.  Contacted patient this AM to see what sides effects patient was having.  Patient stated she is not having any side effects and  was just concerned if she had side effects. Patient advised that some of the common side effects can be diarrhea, upset stomach, nausea.  Patient advise to call office if she has any side effects. Patient stated understanding.

## 2024-05-08 ENCOUNTER — OFFICE VISIT (OUTPATIENT)
Dept: FAMILY MEDICINE CLINIC | Facility: CLINIC | Age: 54
End: 2024-05-08
Payer: COMMERCIAL

## 2024-05-08 VITALS
BODY MASS INDEX: 25.02 KG/M2 | DIASTOLIC BLOOD PRESSURE: 70 MMHG | WEIGHT: 168.9 LBS | SYSTOLIC BLOOD PRESSURE: 120 MMHG | OXYGEN SATURATION: 99 % | HEART RATE: 64 BPM | HEIGHT: 69 IN

## 2024-05-08 DIAGNOSIS — E03.9 HYPOTHYROIDISM (ACQUIRED): ICD-10-CM

## 2024-05-08 DIAGNOSIS — K76.6 PORTAL HYPERTENSION DUE TO OBSTRUCTION OF EXTRAHEPATIC PORTAL VEIN: ICD-10-CM

## 2024-05-08 DIAGNOSIS — I81 PORTAL VEIN THROMBOSIS: ICD-10-CM

## 2024-05-08 DIAGNOSIS — K74.69 OTHER CIRRHOSIS OF LIVER: ICD-10-CM

## 2024-05-08 DIAGNOSIS — N12 PYELONEPHRITIS: ICD-10-CM

## 2024-05-08 DIAGNOSIS — Z00.00 GENERAL MEDICAL EXAM: Primary | ICD-10-CM

## 2024-05-08 DIAGNOSIS — Z13.1 DIABETES MELLITUS SCREENING: ICD-10-CM

## 2024-05-08 DIAGNOSIS — D69.6 THROMBOCYTOPENIA: ICD-10-CM

## 2024-05-08 DIAGNOSIS — R10.9 FLANK PAIN: ICD-10-CM

## 2024-05-08 DIAGNOSIS — E53.8 VITAMIN B12 DEFICIENCY: ICD-10-CM

## 2024-05-08 DIAGNOSIS — I81 PORTAL HYPERTENSION DUE TO OBSTRUCTION OF EXTRAHEPATIC PORTAL VEIN: ICD-10-CM

## 2024-05-08 DIAGNOSIS — Z23 NEED FOR TDAP VACCINATION: ICD-10-CM

## 2024-05-08 PROBLEM — K70.30 ALCOHOLIC CIRRHOSIS OF LIVER WITHOUT ASCITES: Status: RESOLVED | Noted: 2023-04-18 | Resolved: 2024-05-08

## 2024-05-08 PROCEDURE — 90471 IMMUNIZATION ADMIN: CPT | Performed by: FAMILY MEDICINE

## 2024-05-08 PROCEDURE — 99396 PREV VISIT EST AGE 40-64: CPT | Performed by: FAMILY MEDICINE

## 2024-05-08 PROCEDURE — 36415 COLL VENOUS BLD VENIPUNCTURE: CPT | Performed by: FAMILY MEDICINE

## 2024-05-08 PROCEDURE — 90715 TDAP VACCINE 7 YRS/> IM: CPT | Performed by: FAMILY MEDICINE

## 2024-05-08 RX ORDER — FUROSEMIDE 20 MG/1
20 TABLET ORAL DAILY
Start: 2024-05-08

## 2024-05-08 RX ORDER — LEVOTHYROXINE SODIUM 0.05 MG/1
50 TABLET ORAL DAILY
Qty: 90 TABLET | Refills: 3 | Status: SHIPPED | OUTPATIENT
Start: 2024-05-08

## 2024-05-08 RX ORDER — SPIRONOLACTONE 50 MG/1
50 TABLET, FILM COATED ORAL DAILY
Start: 2024-05-08

## 2024-05-08 RX ORDER — LANOLIN ALCOHOL/MO/W.PET/CERES
1000 CREAM (GRAM) TOPICAL DAILY
Qty: 90 TABLET | Refills: 3 | Status: SHIPPED | OUTPATIENT
Start: 2024-05-08

## 2024-05-09 LAB
ALBUMIN SERPL-MCNC: 4.2 G/DL (ref 3.8–4.9)
ALBUMIN/GLOB SERPL: 1.5 {RATIO} (ref 1.2–2.2)
ALP SERPL-CCNC: 103 IU/L (ref 44–121)
ALT SERPL-CCNC: 19 IU/L (ref 0–32)
AST SERPL-CCNC: 25 IU/L (ref 0–40)
BASOPHILS # BLD AUTO: 0.1 X10E3/UL (ref 0–0.2)
BASOPHILS NFR BLD AUTO: 2 %
BILIRUB SERPL-MCNC: 2.5 MG/DL (ref 0–1.2)
BUN SERPL-MCNC: 12 MG/DL (ref 6–24)
BUN/CREAT SERPL: 10 (ref 9–23)
CALCIUM SERPL-MCNC: 9.2 MG/DL (ref 8.7–10.2)
CHLORIDE SERPL-SCNC: 103 MMOL/L (ref 96–106)
CHOLEST SERPL-MCNC: 182 MG/DL (ref 100–199)
CO2 SERPL-SCNC: 21 MMOL/L (ref 20–29)
CREAT SERPL-MCNC: 1.23 MG/DL (ref 0.57–1)
EGFRCR SERPLBLD CKD-EPI 2021: 53 ML/MIN/1.73
EOSINOPHIL # BLD AUTO: 0.2 X10E3/UL (ref 0–0.4)
EOSINOPHIL NFR BLD AUTO: 7 %
ERYTHROCYTE [DISTWIDTH] IN BLOOD BY AUTOMATED COUNT: 13.9 % (ref 11.7–15.4)
GLOBULIN SER CALC-MCNC: 2.8 G/DL (ref 1.5–4.5)
GLUCOSE SERPL-MCNC: 87 MG/DL (ref 70–99)
HBA1C MFR BLD: 5.4 % (ref 4.8–5.6)
HCT VFR BLD AUTO: 38.4 % (ref 34–46.6)
HDLC SERPL-MCNC: 55 MG/DL
HGB BLD-MCNC: 12.8 G/DL (ref 11.1–15.9)
IMM GRANULOCYTES # BLD AUTO: 0 X10E3/UL (ref 0–0.1)
IMM GRANULOCYTES NFR BLD AUTO: 0 %
LDLC SERPL CALC-MCNC: 110 MG/DL (ref 0–99)
LYMPHOCYTES # BLD AUTO: 1 X10E3/UL (ref 0.7–3.1)
LYMPHOCYTES NFR BLD AUTO: 29 %
MCH RBC QN AUTO: 33.1 PG (ref 26.6–33)
MCHC RBC AUTO-ENTMCNC: 33.3 G/DL (ref 31.5–35.7)
MCV RBC AUTO: 99 FL (ref 79–97)
MONOCYTES # BLD AUTO: 0.4 X10E3/UL (ref 0.1–0.9)
MONOCYTES NFR BLD AUTO: 13 %
MORPHOLOGY BLD-IMP: ABNORMAL
NEUTROPHILS # BLD AUTO: 1.8 X10E3/UL (ref 1.4–7)
NEUTROPHILS NFR BLD AUTO: 49 %
PLATELET # BLD AUTO: 98 X10E3/UL (ref 150–450)
POTASSIUM SERPL-SCNC: 3.6 MMOL/L (ref 3.5–5.2)
PROT SERPL-MCNC: 7 G/DL (ref 6–8.5)
RBC # BLD AUTO: 3.87 X10E6/UL (ref 3.77–5.28)
SODIUM SERPL-SCNC: 141 MMOL/L (ref 134–144)
T4 FREE SERPL-MCNC: 1.32 NG/DL (ref 0.82–1.77)
TRIGL SERPL-MCNC: 95 MG/DL (ref 0–149)
TSH SERPL DL<=0.005 MIU/L-ACNC: 2.25 UIU/ML (ref 0.45–4.5)
VIT B12 SERPL-MCNC: 1902 PG/ML (ref 232–1245)
VLDLC SERPL CALC-MCNC: 17 MG/DL (ref 5–40)
WBC # BLD AUTO: 3.5 X10E3/UL (ref 3.4–10.8)

## 2024-08-16 DIAGNOSIS — E03.9 HYPOTHYROIDISM (ACQUIRED): ICD-10-CM

## 2024-08-16 RX ORDER — LEVOTHYROXINE SODIUM 0.05 MG/1
50 TABLET ORAL DAILY
Qty: 90 TABLET | Refills: 3 | Status: SHIPPED | OUTPATIENT
Start: 2024-08-16

## 2024-10-30 DIAGNOSIS — E53.8 VITAMIN B12 DEFICIENCY: ICD-10-CM

## 2024-10-30 RX ORDER — LANOLIN ALCOHOL/MO/W.PET/CERES
1000 CREAM (GRAM) TOPICAL DAILY
Qty: 90 TABLET | Refills: 3 | Status: SHIPPED | OUTPATIENT
Start: 2024-10-30

## 2025-05-12 ENCOUNTER — OFFICE VISIT (OUTPATIENT)
Dept: FAMILY MEDICINE CLINIC | Facility: CLINIC | Age: 55
End: 2025-05-12
Payer: COMMERCIAL

## 2025-05-12 VITALS
BODY MASS INDEX: 24.08 KG/M2 | HEART RATE: 78 BPM | OXYGEN SATURATION: 98 % | WEIGHT: 168.2 LBS | SYSTOLIC BLOOD PRESSURE: 114 MMHG | HEIGHT: 70 IN | DIASTOLIC BLOOD PRESSURE: 76 MMHG

## 2025-05-12 DIAGNOSIS — E53.8 VITAMIN B12 DEFICIENCY: ICD-10-CM

## 2025-05-12 DIAGNOSIS — Z00.00 GENERAL MEDICAL EXAM: Primary | ICD-10-CM

## 2025-05-12 DIAGNOSIS — K76.6 PORTAL HYPERTENSION DUE TO OBSTRUCTION OF EXTRAHEPATIC PORTAL VEIN: ICD-10-CM

## 2025-05-12 DIAGNOSIS — D69.6 THROMBOCYTOPENIA: ICD-10-CM

## 2025-05-12 DIAGNOSIS — I81 PORTAL VEIN THROMBOSIS: ICD-10-CM

## 2025-05-12 DIAGNOSIS — K74.69 OTHER CIRRHOSIS OF LIVER: ICD-10-CM

## 2025-05-12 DIAGNOSIS — I81 PORTAL HYPERTENSION DUE TO OBSTRUCTION OF EXTRAHEPATIC PORTAL VEIN: ICD-10-CM

## 2025-05-12 DIAGNOSIS — K86.2 PANCREATIC CYST: ICD-10-CM

## 2025-05-12 DIAGNOSIS — E03.9 HYPOTHYROIDISM (ACQUIRED): ICD-10-CM

## 2025-05-12 DIAGNOSIS — Z13.1 DIABETES MELLITUS SCREENING: ICD-10-CM

## 2025-05-12 PROBLEM — Z23 NEED FOR TDAP VACCINATION: Status: RESOLVED | Noted: 2024-05-08 | Resolved: 2025-05-12

## 2025-05-12 PROBLEM — N12 PYELONEPHRITIS: Status: RESOLVED | Noted: 2024-04-19 | Resolved: 2025-05-12

## 2025-05-12 PROBLEM — R10.9 FLANK PAIN: Status: RESOLVED | Noted: 2024-04-19 | Resolved: 2025-05-12

## 2025-05-12 PROCEDURE — 99396 PREV VISIT EST AGE 40-64: CPT | Performed by: FAMILY MEDICINE

## 2025-05-12 RX ORDER — LEVOTHYROXINE SODIUM 50 UG/1
50 TABLET ORAL DAILY
Qty: 90 TABLET | Refills: 3 | Status: SHIPPED | OUTPATIENT
Start: 2025-05-12

## 2025-05-12 RX ORDER — KETOCONAZOLE 20 MG/ML
120 SHAMPOO, SUSPENSION TOPICAL 2 TIMES WEEKLY
COMMUNITY
Start: 2025-04-16

## 2025-05-12 RX ORDER — LANOLIN ALCOHOL/MO/W.PET/CERES
1000 CREAM (GRAM) TOPICAL DAILY
Qty: 90 TABLET | Refills: 3 | Status: SHIPPED | OUTPATIENT
Start: 2025-05-12

## 2025-05-12 NOTE — ASSESSMENT & PLAN NOTE
Discussed together health maintenance and screening along with vaccination options and healthy diet and exercise habits as part of the preventative counseling at their physical exam today.    Cheiloplasty (Less Than 50%) Text: A decision was made to reconstruct the defect with a  cheiloplasty.  The defect was undermined extensively.  Additional obicularis oris muscle was excised with a 15c blade scalpel.  The defect was converted into a full thickness wedge, of less than 50% of the vertical height of the lip, to facilite a better cosmetic result.  Small vessels were then tied off with 5-0 monocyrl. The obicularis oris, superficial fascia, adipose and dermis were then reapproximated.  After the deeper layers were approximated the epidermis was reapproximated with particular care given to realign the vermilion border.

## 2025-05-12 NOTE — ASSESSMENT & PLAN NOTE
MRI of abd 2/2024 w/ 8mm pacreatic cyst (no worrisome features) and plan for repeat imaging with GI in 1 yr (feb 2026)

## 2025-05-12 NOTE — PROGRESS NOTES
"Chief Complaint  Annual Exam    Subjective    History of Present Illness:  Antonieta Oliver is a 54 y.o. female who presents today for physical exam    Declines prevnar 20    She had an episode with distended abdomen with work-up that indicated cirrhosis in 2023.  They did do lab work-up with no clear etiology and felt it might be related to moderate to heavy alcohol use in her past.  She was not a daily drinker.  She has abstained from alcohol since the diagnosis of presumed alcoholic cirrhosis and her MELD score has improved.  Consult at  given diagnosis of alcoholic cirrhosis did not fit her picture.  They did workup including liver biopsy and she was given a diagnosis of non-cirrhotic portal hypertension contributing to the ascites related to her history of chronic portal vein thrombosis.  Continues on combination of furosemide and spironolactone to help with mild ascites.    MRI of abd 2/2024 w/ 8mm pacreatic cyst (no worrisome features) and plan for repeat imaging with GI in 1 yr (feb 2026)    Ongoing follow-up with GI at [facility] - they do not know if it will progress or stay the same.     She does continue on progesterone from gynecology given problems with menopausal symptoms and insomnia.  Pap up-to-date 2023.  Mammogram up-to-date 3/425.     Colonoscopy up-to-date August 2022 with polyps.  They recommended she repeat in 3 years (August 2025).  Plans to get through [facility] GI - schedule for 8/9/2025    HepB series completed through  Hepatology.     Continues on Synthroid for hypothyroidism and vitamin B12 given history of vitamin B12 deficiency.    Follow-up ongoing with hematology for chronic thrombocytopenia now felt possibly related to her cirrhosis.    Objective   Vital Signs:   /76 (BP Location: Left arm, Patient Position: Sitting, Cuff Size: Adult)   Pulse 78   Ht 177.8 cm (70\")   Wt 76.3 kg (168 lb 3.2 oz)   SpO2 98%   BMI 24.13 kg/m²     Review of Systems   Constitutional:  Negative " for appetite change, chills and fever.   HENT:  Negative for hearing loss.    Eyes:  Negative for blurred vision.   Respiratory:  Negative for chest tightness.    Cardiovascular:  Negative for chest pain.   Gastrointestinal:  Negative for abdominal pain.   Musculoskeletal:  Negative for gait problem.   Skin:  Negative for rash.   Psychiatric/Behavioral:  Negative for depressed mood.        Past History:  Medical History: has a past medical history of Disease of thyroid gland, Hypothyroidism, Kidney stone (07/2017), and Liver disease (04/2022).   Surgical History: has a past surgical history that includes Foot surgery (Bilateral); Colectomy partial / total; Cholecystectomy; Colon surgery; and Colonoscopy (08/2022).   Family History: family history is not on file. She was adopted.   Social History: reports that she has never smoked. She has never been exposed to tobacco smoke. She has never used smokeless tobacco. She reports that she does not currently use alcohol. She reports that she does not use drugs.      Current Outpatient Medications:     furosemide (LASIX) 20 MG tablet, Take 1 tablet by mouth Daily. (Rx by GI), Disp: , Rfl:     ketoconazole (NIZORAL) 2 % shampoo, Apply 120 mL topically to the appropriate area as directed 2 (Two) Times a Week., Disp: , Rfl:     levothyroxine (SYNTHROID, LEVOTHROID) 50 MCG tablet, Take 1 tablet by mouth Daily., Disp: 90 tablet, Rfl: 3    Progesterone (PROMETRIUM) 100 MG capsule, Take 1 capsule by mouth every night at bedtime., Disp: , Rfl:     spironolactone (ALDACTONE) 50 MG tablet, Take 1 tablet by mouth Daily. (Rx by GI), Disp: , Rfl:     vitamin B-12 (CYANOCOBALAMIN) 1000 MCG tablet, Take 1 tablet by mouth Daily., Disp: 90 tablet, Rfl: 3    VITAMIN D PO, , Disp: , Rfl:     ondansetron ODT (ZOFRAN-ODT) 4 MG disintegrating tablet, Place 1 tablet on the tongue Every 8 (Eight) Hours As Needed for Nausea or Vomiting. (Patient not taking: Reported on 5/12/2025), Disp: 20 tablet,  Rfl: 0    Allergies: Patient has no known allergies.    Physical Exam  Constitutional:       Appearance: Normal appearance.   HENT:      Head: Normocephalic.      Right Ear: External ear normal.      Left Ear: External ear normal.      Nose: Nose normal.   Eyes:      Pupils: Pupils are equal, round, and reactive to light.   Cardiovascular:      Rate and Rhythm: Normal rate and regular rhythm.      Heart sounds: Normal heart sounds.   Pulmonary:      Effort: Pulmonary effort is normal.      Breath sounds: Normal breath sounds.   Musculoskeletal:         General: Normal range of motion.      Cervical back: Normal range of motion.   Skin:     General: Skin is warm and dry.   Neurological:      General: No focal deficit present.      Mental Status: She is alert.   Psychiatric:         Mood and Affect: Mood normal.         Behavior: Behavior normal.         Thought Content: Thought content normal.          Result Review                   Assessment and Plan  Diagnoses and all orders for this visit:    1. General medical exam (Primary)  Assessment & Plan:  Discussed together health maintenance and screening along with vaccination options and healthy diet and exercise habits as part of the preventative counseling at their physical exam today.     Orders:  -     CBC & Differential; Future  -     Comprehensive Metabolic Panel; Future  -     Lipid Panel; Future  -     TSH; Future  -     T4, Free; Future  -     T4, Free  -     TSH  -     Lipid Panel  -     Comprehensive Metabolic Panel  -     CBC & Differential    2. Diabetes mellitus screening  -     Hemoglobin A1c; Future  -     Hemoglobin A1c    3. Hypothyroidism (acquired)  Assessment & Plan:  Refilled Synthroid.    Orders:  -     levothyroxine (SYNTHROID, LEVOTHROID) 50 MCG tablet; Take 1 tablet by mouth Daily.  Dispense: 90 tablet; Refill: 3    4. Portal hypertension due to obstruction of extrahepatic portal vein  Assessment & Plan:  Cont GI followup with [facility]        5. Portal vein thrombosis  Assessment & Plan:  Chronic.  Hematology did not recommend that she stay on anticoagulation given new discovery of liver disease in 2023.      6. Other cirrhosis of liver  Assessment & Plan:  See above      7. Thrombocytopenia  Assessment & Plan:  No bleeding problems.  They now suspect this may be related to her chronic liver disease and cirrhosis.  Hematology and hepatology following      8. Vitamin B12 deficiency  Assessment & Plan:  Continues on b12     Orders:  -     Vitamin B12; Future  -     vitamin B-12 (CYANOCOBALAMIN) 1000 MCG tablet; Take 1 tablet by mouth Daily.  Dispense: 90 tablet; Refill: 3  -     Vitamin B12    9. Pancreatic cyst  Assessment & Plan:  MRI of abd 2/2024 w/ 8mm pacreatic cyst (no worrisome features) and plan for repeat imaging with GI in 1 yr (feb 2026)          BMI is within normal parameters. No other follow-up for BMI required.          Follow Up  Return in about 1 year (around 5/13/2026) for Annual physical.    Man Messina MD

## 2025-05-12 NOTE — ASSESSMENT & PLAN NOTE
Chronic.  Hematology did not recommend that she stay on anticoagulation given new discovery of liver disease in 2023.

## 2025-05-13 LAB
ALBUMIN SERPL-MCNC: 4.4 G/DL (ref 3.8–4.9)
ALP SERPL-CCNC: 109 IU/L (ref 44–121)
ALT SERPL-CCNC: 20 IU/L (ref 0–32)
AST SERPL-CCNC: 30 IU/L (ref 0–40)
BASOPHILS # BLD AUTO: 0.1 X10E3/UL (ref 0–0.2)
BASOPHILS NFR BLD AUTO: 2 %
BILIRUB SERPL-MCNC: 2 MG/DL (ref 0–1.2)
BUN SERPL-MCNC: 17 MG/DL (ref 6–24)
BUN/CREAT SERPL: 13 (ref 9–23)
CALCIUM SERPL-MCNC: 9.4 MG/DL (ref 8.7–10.2)
CHLORIDE SERPL-SCNC: 104 MMOL/L (ref 96–106)
CHOLEST SERPL-MCNC: 194 MG/DL (ref 100–199)
CO2 SERPL-SCNC: 19 MMOL/L (ref 20–29)
CREAT SERPL-MCNC: 1.34 MG/DL (ref 0.57–1)
EGFRCR SERPLBLD CKD-EPI 2021: 47 ML/MIN/1.73
EOSINOPHIL # BLD AUTO: 0.3 X10E3/UL (ref 0–0.4)
EOSINOPHIL NFR BLD AUTO: 8 %
ERYTHROCYTE [DISTWIDTH] IN BLOOD BY AUTOMATED COUNT: 12.8 % (ref 11.7–15.4)
GLOBULIN SER CALC-MCNC: 3.1 G/DL (ref 1.5–4.5)
GLUCOSE SERPL-MCNC: 102 MG/DL (ref 70–99)
HBA1C MFR BLD: 5.4 % (ref 4.8–5.6)
HCT VFR BLD AUTO: 45 % (ref 34–46.6)
HDLC SERPL-MCNC: 71 MG/DL
HGB BLD-MCNC: 14.3 G/DL (ref 11.1–15.9)
IMM GRANULOCYTES # BLD AUTO: 0 X10E3/UL (ref 0–0.1)
IMM GRANULOCYTES NFR BLD AUTO: 0 %
LDLC SERPL CALC-MCNC: 112 MG/DL (ref 0–99)
LYMPHOCYTES # BLD AUTO: 0.8 X10E3/UL (ref 0.7–3.1)
LYMPHOCYTES NFR BLD AUTO: 21 %
MCH RBC QN AUTO: 31.7 PG (ref 26.6–33)
MCHC RBC AUTO-ENTMCNC: 31.8 G/DL (ref 31.5–35.7)
MCV RBC AUTO: 100 FL (ref 79–97)
MONOCYTES # BLD AUTO: 0.5 X10E3/UL (ref 0.1–0.9)
MONOCYTES NFR BLD AUTO: 13 %
MORPHOLOGY BLD-IMP: ABNORMAL
NEUTROPHILS # BLD AUTO: 2.2 X10E3/UL (ref 1.4–7)
NEUTROPHILS NFR BLD AUTO: 56 %
PLATELET # BLD AUTO: 100 X10E3/UL (ref 150–450)
POTASSIUM SERPL-SCNC: 3.8 MMOL/L (ref 3.5–5.2)
PROT SERPL-MCNC: 7.5 G/DL (ref 6–8.5)
RBC # BLD AUTO: 4.51 X10E6/UL (ref 3.77–5.28)
SODIUM SERPL-SCNC: 140 MMOL/L (ref 134–144)
T4 FREE SERPL-MCNC: 1.58 NG/DL (ref 0.82–1.77)
TRIGL SERPL-MCNC: 62 MG/DL (ref 0–149)
TSH SERPL DL<=0.005 MIU/L-ACNC: 3.8 UIU/ML (ref 0.45–4.5)
VIT B12 SERPL-MCNC: 1602 PG/ML (ref 232–1245)
VLDLC SERPL CALC-MCNC: 11 MG/DL (ref 5–40)
WBC # BLD AUTO: 4 X10E3/UL (ref 3.4–10.8)

## (undated) DEVICE — MMIS - ARMBOARD IV FOAM

## (undated) DEVICE — MMIS - SYRINGE 70CC LL ADPR TIP STRL IRR LF DISP TOOMEY

## (undated) DEVICE — MMIS - POSITIONER PSTN FOAM CRDL HDRST LF

## (undated) DEVICE — MMIS - GOWN SURG XL AAMI L3 REINFORCE STRL BACK SET IN

## (undated) DEVICE — MMIS - GUIDEWIRE SNSR DUAL-FLEX 10CM STRGT .035IN 150CM

## (undated) DEVICE — MMIS - SOLUTION URMTC 0.9% NACL 3L LF IRR

## (undated) DEVICE — MMIS - LINER SCT 1000CC CRD MDVC LOCK LID SHTOF VLV CNSTR

## (undated) DEVICE — MMIS - SHEET PATRAN TRNSF

## (undated) DEVICE — MMIS - PACK SURG CYSTO LTX SAFE

## (undated) DEVICE — MMIS - LIDOCAINE JELLY URO-JET 10ML (RX)

## (undated) DEVICE — MMIS - GLOVE SURG 7.5 PROTEXIS LF CRM PF BEAD CUFF STRL